# Patient Record
(demographics unavailable — no encounter records)

---

## 2024-10-09 NOTE — PHYSICAL EXAM
[No Acute Distress] : no acute distress [Normal Oropharynx] : normal oropharynx [Normal Appearance] : normal appearance [No Neck Mass] : no neck mass [Normal Rate/Rhythm] : normal rate/rhythm [Normal S1, S2] : normal s1, s2 [No Resp Distress] : no resp distress [Clear to Auscultation Bilaterally] : clear to auscultation bilaterally [No Abnormalities] : no abnormalities [Benign] : benign [No HSM] : no hsm [Normal Gait] : normal gait [No Clubbing] : no clubbing [No Cyanosis] : no cyanosis [No Edema] : no edema [Normal Turgor] : normal turgor [No Focal Deficits] : no focal deficits [Oriented x3] : oriented x3 [Normal Affect] : normal affect

## 2024-10-09 NOTE — HISTORY OF PRESENT ILLNESS
[Former] : former [>= 20 pack years] : >= 20 pack years [Never] : never [TextBox_4] : He was placed on Breztri.  He feels that this may have aggravated urinary retention so he stopped it about 1 week ago.  He has been using albuterol as needed.  He uses it when he feels like he is wheezing.   He has a pet cat. [TextBox_11] : 0.5 [TextBox_13] : 45 [YearQuit] : 23 [Awakes Unrefreshed] : does not awaken unrefreshed [Difficulty Maintaining Sleep] : does not have difficulty maintaining sleep

## 2024-10-09 NOTE — REVIEW OF SYSTEMS
[Sinus Problems] : sinus problems [Wheezing] : wheezing [Nasal Congestion] : nasal congestion [Fever] : no fever [Cough] : no cough [Hemoptysis] : no hemoptysis [Sputum] : no sputum [Pleuritic Pain] : no pleuritic pain [Chest Discomfort] : no chest discomfort [Edema] : no edema [GERD] : no gerd [Back Pain] : no back pain [Rash] : no rash [Headache] : no headache [Anxiety] : no anxiety [Diabetes] : no diabetes [Obesity] : no obesity

## 2024-10-09 NOTE — DISCUSSION/SUMMARY
[FreeTextEntry1] : Impression Asthma -Not well-controlled Possible adverse reaction to anticholinergic -Urinary retention Former smoker Chronic sinus disease  Recommend Start on montelukast and fluticasone Continue with albuterol as needed -Monitor for tremors Low-dose chest CT Follow-up in 3 months or sooner if needed

## 2024-10-09 NOTE — PROCEDURE
[FreeTextEntry1] : CXR 5/15/2024: No acute cardiopulmonary abnormality.  MRI of brain 8/29/2024: Bilateral maxillary mucoid retention cysts.  Deviated nasal septum.  PFT 10/9/24: Moderate obstruction.  No significant changes to bronchodilator.